# Patient Record
Sex: MALE | Race: OTHER | URBAN - METROPOLITAN AREA
[De-identification: names, ages, dates, MRNs, and addresses within clinical notes are randomized per-mention and may not be internally consistent; named-entity substitution may affect disease eponyms.]

---

## 2022-09-26 ENCOUNTER — EMERGENCY (EMERGENCY)
Facility: HOSPITAL | Age: 77
LOS: 1 days | Discharge: HOME | End: 2022-09-26
Admitting: EMERGENCY MEDICINE
Payer: COMMERCIAL

## 2022-09-26 VITALS
SYSTOLIC BLOOD PRESSURE: 168 MMHG | TEMPERATURE: 98 F | OXYGEN SATURATION: 95 % | HEIGHT: 69.69 IN | DIASTOLIC BLOOD PRESSURE: 84 MMHG | RESPIRATION RATE: 18 BRPM | HEART RATE: 83 BPM | WEIGHT: 198.42 LBS

## 2022-09-26 PROCEDURE — 99283 EMERGENCY DEPT VISIT LOW MDM: CPT | Mod: 25

## 2022-09-26 PROCEDURE — 73564 X-RAY EXAM KNEE 4 OR MORE: CPT | Mod: 26,RT

## 2022-09-26 PROCEDURE — 96372 THER/PROPH/DIAG INJ SC/IM: CPT

## 2022-09-26 PROCEDURE — 73564 X-RAY EXAM KNEE 4 OR MORE: CPT

## 2022-09-26 PROCEDURE — 99284 EMERGENCY DEPT VISIT MOD MDM: CPT | Mod: 25

## 2022-09-26 RX ORDER — KETOROLAC TROMETHAMINE 30 MG/ML
15 SYRINGE (ML) INJECTION ONCE
Refills: 0 | Status: DISCONTINUED | OUTPATIENT
Start: 2022-09-26 | End: 2022-09-26

## 2022-09-26 RX ADMIN — Medication 15 MILLIGRAM(S): at 11:55

## 2022-09-26 RX ADMIN — Medication 15 MILLIGRAM(S): at 12:19

## 2022-09-26 NOTE — ED ADULT NURSE NOTE - OBJECTIVE STATEMENT
77y male presents to ED c/o right knee pain after fall on Saturday. Denies LOC, head injury. Pt ambulatory. No signs of trauma. A&Ox4.

## 2022-09-26 NOTE — ED PROVIDER NOTE - PATIENT PORTAL LINK FT
You can access the FollowMyHealth Patient Portal offered by [facility] by registering at the following website: [email or web address]. By joining [facility]’s FollowMyHealth portal, you will also be able to view your health information using other applications (apps) compatible with our system.

## 2022-09-26 NOTE — ED PROVIDER NOTE - NSFOLLOWUPINSTRUCTIONS_ED_ALL_ED_FT
Take tylenol 650mg or motrin 400-800mg as needed every 4-6 hours for pain.   REST- Rest your hurting/injured joint or extremity to decrease pain and swelling for 24-48 hours    ICE- Apply ice to area of pain to decreased inflammation and pain, put towel/barrier between ice and skin. You can keep ice on for 20 minutes at a time 4-8 times daily   COMPRESSION- Wear ace wrap or brace for support to reduce swelling.  Make sure not to wrap too tight, loosen if skin feeling numb/tingling or skin turns blue   ELEVATION- Elevate hurting/injured area 6 or more inches about level of heart to decrease swelling/inflammation.  Use pillow under joint to elevate area    [facility] (Thursday afternoons) - call [phone] to schedule  You may call our referrals coordinator at [phone] Monday to Friday 11am-7pm for assistance with making an appointment      Tendinitis    WHAT YOU NEED TO KNOW:    Tendinitis is painful inflammation or breakdown of your tendons. It may also be called tendinopathy. Tendinitis often occurs in the knee, shoulder, ankle, hip, or elbow.    DISCHARGE INSTRUCTIONS:    Return to the emergency department if:   •You have increased redness over the joint, or swelling in the joint.      •You suddenly cannot move your joint.      Call your doctor if:   •You have increased pain even after you take medicine.      •You have questions or concerns about your condition or care.      Medicines:   •Acetaminophen decreases pain and fever. It is available without a doctor's order. Ask how much to take and how often to take it. Follow directions. Read the labels of all other medicines you are using to see if they also contain acetaminophen, or ask your doctor or pharmacist. Acetaminophen can cause liver damage if not taken correctly.      •NSAIDs, such as ibuprofen, help decrease swelling, pain, and fever. This medicine is available with or without a doctor's order. NSAIDs can cause stomach bleeding or kidney problems in certain people. If you take blood thinner medicine, always ask your healthcare provider if NSAIDs are safe for you. Always read the medicine label and follow directions.      •Take your medicine as directed. Contact your healthcare provider if you think your medicine is not helping or if you have side effects. Tell your provider if you are allergic to any medicine. Keep a list of the medicines, vitamins, and herbs you take. Include the amounts, and when and why you take them. Bring the list or the pill bottles to follow-up visits. Carry your medicine list with you in case of an emergency.      Manage tendinitis:   •Rest your tendon as directed to help it heal. Ask your healthcare provider if you need to stop putting weight on your affected area.      •Apply ice to help decrease swelling and pain. Use an ice pack, or put crushed ice in a plastic bag. Cover the bag with a towel before you place it on the affected area. Apply ice for 10 to 15 minutes every hour, or as directed.      •Use a support device, such as a cane, splint, shoe insert, or brace. A support device may help reduce your pain.      •Go to physical therapy if directed. A physical therapist can teach you exercises to help improve movement and strength, and to decrease pain. You may also learn how to improve your posture, and how to lift or exercise correctly.      Prevent tendinitis:   •Warm up and cool down when you exercise. Run in place slowly or walk at a brisk pace to warm your muscles before you exercise. When you finish exercising, walk for a few minutes to cool down.  Warm up and Cool Down            •Exercise regularly to strengthen the muscles around your joint. Ease into an exercise routine for the first 3 weeks to prevent another injury. Ask your healthcare provider about the best exercise plan for you. Rest fully between activities.      •Use the right equipment for sports and exercise. Wear braces or tape around weak joints as directed.      Follow up with your doctor as directed: Write down your questions so you remember to ask them during your visits.

## 2022-09-26 NOTE — ED PROVIDER NOTE - CARE PROVIDER_API CALL
Tarik Valiente)  Orthopaedic Surgery  [address]  [place], [place] [address]  Phone: [phone]  Fax: [phone]  Follow Up Time:

## 2022-09-26 NOTE — ED ADULT TRIAGE NOTE - NS ED TRIAGE AVPU SCALE
ID band present and verified. Family is in the waiting area. Alert-The patient is alert, awake and responds to voice. The patient is oriented to time, place, and person. The triage nurse is able to obtain subjective information.

## 2022-09-26 NOTE — ED ADULT NURSE NOTE - NSIMPLEMENTINTERV_GEN_ALL_ED
Implemented All Fall Risk Interventions:  [place] to call system. Call bell, personal items and telephone within reach. Instruct patient to call for assistance. Room bathroom lighting operational. Non-slip footwear when patient is off stretcher. Physically safe environment: no spills, clutter or unnecessary equipment. Stretcher in lowest position, wheels locked, appropriate side rails in place. Provide visual cue, wrist band, yellow gown, etc. Monitor gait and stability. Monitor for mental status changes and reorient to person, place, and time. Review medications for side effects contributing to fall risk. Reinforce activity limits and safety measures with patient and family.

## 2022-09-26 NOTE — ED PROVIDER NOTE - CLINICAL SUMMARY MEDICAL DECISION MAKING FREE TEXT BOX
77 M pmh htn p/w R knee pain x one month; no direct trauma, + twisting injury which exacerbated pain earlier this week. on exam RLE: skin intact, pain w/ ROM knee but not limited, no ttp or deformity, no effusion, no calf ttp/edema, DP/PT pulse 2+, cap refill < 2 sec, SILT.  suspect tendonitis/soft tissue injury.  xray shows no e/o fracture or dislocation.  given NSAID and educated to continue RICE, outpt ortho f/u.  discussed strict return parameters

## 2022-09-26 NOTE — ED PROVIDER NOTE - PHYSICAL EXAMINATION
Gen: well appearing, no acute distress  Skin: warm/dry, no rash noted  Resp: breathing comfortably, speaking in full sentences, no dyspnea  RLE: pain w/ ROM knee but not limited, no ttp or deformity, no effusion, no calf ttp/edema, DP/PT pulse 2+, cap refill < 2 sec, SILT   Neuro: alert/oriented, ambulatory with cane/limp

## 2022-09-26 NOTE — ED ADULT TRIAGE NOTE - CHIEF COMPLAINT QUOTE
Reports trip and fall on Saturday c/o R knee pain. Denies head strike, LOC, blood thinners. Denies chest pain, SOB, numbness, tingling.

## 2022-09-26 NOTE — ED PROVIDER NOTE - OBJECTIVE STATEMENT
77 M pmh htn p/w R knee pain x one month.  pt reports traveling from [place] and was having mild R knee pain w. walking/movement prior to travel, now after twisting injury but no actual fall pt reports worsening pain in back of knee extending to lateral and anterior aspect.  pt wearing brace and taking OTC tylenol w/ little improvement.  pt will be traveling more and wnted to make sure nothing was injured in knee.  denies f/c, skin changes/rash, calf pain/swelling, chest pain, sob, numbness/weakness, limited ROM, direct trauma to knee

## 2022-09-28 DIAGNOSIS — M25.561 PAIN IN RIGHT KNEE: ICD-10-CM
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
## 2022-09-28 DIAGNOSIS — I10 ESSENTIAL (PRIMARY) HYPERTENSION: ICD-10-CM
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
